# Patient Record
Sex: FEMALE
[De-identification: names, ages, dates, MRNs, and addresses within clinical notes are randomized per-mention and may not be internally consistent; named-entity substitution may affect disease eponyms.]

---

## 2021-06-21 ENCOUNTER — HOSPITAL ENCOUNTER (EMERGENCY)
Dept: HOSPITAL 53 - M ED | Age: 26
Discharge: HOME | End: 2021-06-21
Payer: COMMERCIAL

## 2021-06-21 VITALS — BODY MASS INDEX: 23.64 KG/M2 | HEIGHT: 64 IN | WEIGHT: 138.45 LBS

## 2021-06-21 VITALS — DIASTOLIC BLOOD PRESSURE: 62 MMHG | SYSTOLIC BLOOD PRESSURE: 106 MMHG

## 2021-06-21 DIAGNOSIS — R51.9: ICD-10-CM

## 2021-06-21 DIAGNOSIS — Z3A.10: ICD-10-CM

## 2021-06-21 DIAGNOSIS — O99.891: Primary | ICD-10-CM

## 2021-06-21 LAB
APPEARANCE UR: (no result)
BACTERIA UR QL AUTO: NEGATIVE
BASOPHILS # BLD AUTO: 0 10^3/UL (ref 0–0.2)
BASOPHILS NFR BLD AUTO: 0.3 % (ref 0–1)
BILIRUB UR QL STRIP.AUTO: NEGATIVE
EOSINOPHIL # BLD AUTO: 0 10^3/UL (ref 0–0.5)
EOSINOPHIL NFR BLD AUTO: 0.4 % (ref 0–3)
GLUCOSE UR QL STRIP.AUTO: (no result) MG/DL
HCT VFR BLD AUTO: 36.6 % (ref 36–47)
HGB BLD-MCNC: 12.4 G/DL (ref 12–15.5)
HGB UR QL STRIP.AUTO: NEGATIVE
KETONES UR QL STRIP.AUTO: (no result) MG/DL
LEUKOCYTE ESTERASE UR QL STRIP.AUTO: (no result)
LYMPHOCYTES # BLD AUTO: 2.2 10^3/UL (ref 1.5–5)
LYMPHOCYTES NFR BLD AUTO: 21.7 % (ref 24–44)
MCH RBC QN AUTO: 31.2 PG (ref 27–33)
MCHC RBC AUTO-ENTMCNC: 33.9 G/DL (ref 32–36.5)
MCV RBC AUTO: 92.2 FL (ref 80–96)
MONOCYTES # BLD AUTO: 0.8 10^3/UL (ref 0–0.8)
MONOCYTES NFR BLD AUTO: 7.7 % (ref 2–8)
MUCOUS THREADS URNS QL MICRO: (no result)
NEUTROPHILS # BLD AUTO: 7.1 10^3/UL (ref 1.5–8.5)
NEUTROPHILS NFR BLD AUTO: 69.6 % (ref 36–66)
NITRITE UR QL STRIP.AUTO: NEGATIVE
PH UR STRIP.AUTO: 6 UNITS (ref 5–9)
PLATELET # BLD AUTO: 204 10^3/UL (ref 150–450)
PROT UR QL STRIP.AUTO: NEGATIVE MG/DL
RBC # BLD AUTO: 3.97 10^6/UL (ref 4–5.4)
RBC # UR AUTO: 4 /HPF (ref 0–3)
SP GR UR STRIP.AUTO: 1.01 (ref 1–1.03)
SQUAMOUS #/AREA URNS AUTO: 2 /HPF (ref 0–6)
UROBILINOGEN UR QL STRIP.AUTO: 0.2 MG/DL (ref 0–2)
WBC # BLD AUTO: 10.1 10^3/UL (ref 4–10)
WBC #/AREA URNS AUTO: 11 /HPF (ref 0–3)

## 2021-06-21 PROCEDURE — 96365 THER/PROPH/DIAG IV INF INIT: CPT

## 2021-06-21 PROCEDURE — 85025 COMPLETE CBC W/AUTO DIFF WBC: CPT

## 2021-06-21 PROCEDURE — 87086 URINE CULTURE/COLONY COUNT: CPT

## 2021-06-21 PROCEDURE — 80047 BASIC METABLC PNL IONIZED CA: CPT

## 2021-06-21 PROCEDURE — 84702 CHORIONIC GONADOTROPIN TEST: CPT

## 2021-06-21 PROCEDURE — 96375 TX/PRO/DX INJ NEW DRUG ADDON: CPT

## 2021-06-21 PROCEDURE — 99284 EMERGENCY DEPT VISIT MOD MDM: CPT

## 2021-06-21 PROCEDURE — 81001 URINALYSIS AUTO W/SCOPE: CPT

## 2021-06-21 PROCEDURE — 96361 HYDRATE IV INFUSION ADD-ON: CPT

## 2021-09-10 ENCOUNTER — HOSPITAL ENCOUNTER (OUTPATIENT)
Dept: HOSPITAL 53 - M RAD | Age: 26
End: 2021-09-10
Attending: ADVANCED PRACTICE MIDWIFE
Payer: COMMERCIAL

## 2021-09-10 DIAGNOSIS — Z36.9: Primary | ICD-10-CM

## 2021-09-10 DIAGNOSIS — Z3A.23: ICD-10-CM

## 2021-09-10 NOTE — REP
INDICATION:

ANATOMY.



COMPARISON:

None.



TECHNIQUE:

Transabdominal scanning



FINDINGS:

Multiple ultrasonographic images of the gravid uterus shows a single living

intrauterine gestation in the cephalic presentation.  Doppler interrogation of the

fetal heart shows a heart rate of 149 beats per minute.  The placenta is anterior and

not low-lying.  The cervix measures 3.4 cm in length and is closed.  The subjective

amniotic fluid volume is within normal limits.



BPD: 6 cm 24 weeks 4 days

HC: 22.4 cm 24 weeks 3 days

AC: 18.3 cm 23 weeks 1 day

FL: 4.0 cm 23 weeks 0 days

The estimated fetal weight is 574 g which is at the 88th percentile for a 22 week 2

day gestational age and at the 27th percentile for a 23 week 4 day gestational age.



The fetal anatomical structures seen to be unremarkable are as follows:



Thalami, cavum septum pellucidum, cerebellum, cisterna magna, cerebral ventricles,

spine, kidneys, urinary bladder, four-chamber heart, ventricular outflow tracts,

stomach, cord insertion, three-vessel umbilical cord, fetal upper lip, and fetal

extremities



IMPRESSION:

Single living intrauterine gestation as described above with an estimated gestational

age of 23 weeks 4 days via composite criteria and an estimated date of delivery of

01/03/2022 by today's exam.  No fetal anomalies were detected.





<Electronically signed by Ilir Gasca > 09/10/21 [id number]

## 2021-09-29 ENCOUNTER — HOSPITAL ENCOUNTER (OUTPATIENT)
Dept: HOSPITAL 53 - M PLALAB | Age: 26
End: 2021-09-29
Attending: ADVANCED PRACTICE MIDWIFE
Payer: COMMERCIAL

## 2021-09-29 DIAGNOSIS — O09.892: Primary | ICD-10-CM
